# Patient Record
Sex: MALE | Race: WHITE
[De-identification: names, ages, dates, MRNs, and addresses within clinical notes are randomized per-mention and may not be internally consistent; named-entity substitution may affect disease eponyms.]

---

## 2018-01-11 ENCOUNTER — HOSPITAL ENCOUNTER (EMERGENCY)
Dept: HOSPITAL 63 - ER | Age: 53
Discharge: ANOTHER HOSPITAL | End: 2018-01-11
Payer: COMMERCIAL

## 2018-01-11 VITALS — SYSTOLIC BLOOD PRESSURE: 111 MMHG | DIASTOLIC BLOOD PRESSURE: 69 MMHG

## 2018-01-11 VITALS — HEIGHT: 75 IN | BODY MASS INDEX: 22.94 KG/M2 | WEIGHT: 184.53 LBS

## 2018-01-11 DIAGNOSIS — F17.210: ICD-10-CM

## 2018-01-11 DIAGNOSIS — R74.8: ICD-10-CM

## 2018-01-11 DIAGNOSIS — N32.9: ICD-10-CM

## 2018-01-11 DIAGNOSIS — K56.609: Primary | ICD-10-CM

## 2018-01-11 LAB
% BANDS: 4 % (ref 0–9)
% LYMPHS: 14 % (ref 24–48)
% MONOS: 4 % (ref 0–10)
% SEGS: 77 % (ref 35–66)
ALBUMIN SERPL-MCNC: 4 G/DL (ref 3.4–5)
ALBUMIN/GLOB SERPL: 1 {RATIO} (ref 1–1.7)
ALP SERPL-CCNC: 77 U/L (ref 46–116)
ALT SERPL-CCNC: 34 U/L (ref 16–63)
AMPHETAMINE/METHAMPHETAMINE: (no result)
ANION GAP SERPL CALC-SCNC: 8 MMOL/L (ref 6–14)
APTT PPP: (no result) S
AST SERPL-CCNC: 24 U/L (ref 15–37)
BACTERIA #/AREA URNS HPF: (no result) /HPF
BARBITURATES UR-MCNC: (no result) UG/ML
BASOPHILS # BLD AUTO: 0.1 X10^3/UL (ref 0–0.2)
BASOPHILS NFR BLD: 0 % (ref 0–3)
BENZODIAZ UR-MCNC: (no result) UG/L
BILIRUB SERPL-MCNC: 0.3 MG/DL (ref 0.2–1)
BILIRUB UR QL STRIP: (no result)
BUN/CREAT SERPL: 19 (ref 6–20)
CA-I SERPL ISE-MCNC: 25 MG/DL (ref 8–26)
CALCIUM SERPL-MCNC: 10.2 MG/DL (ref 8.5–10.1)
CANNABINOIDS UR-MCNC: (no result) UG/L
CHLORIDE SERPL-SCNC: 102 MMOL/L (ref 98–107)
CO2 SERPL-SCNC: 31 MMOL/L (ref 21–32)
COCAINE UR-MCNC: (no result) NG/ML
CREAT SERPL-MCNC: 1.3 MG/DL (ref 0.7–1.3)
EOSINOPHIL NFR BLD AUTO: 1 % (ref 0–5)
EOSINOPHIL NFR BLD: 0.3 X10^3/UL (ref 0–0.7)
EOSINOPHIL NFR BLD: 2 % (ref 0–3)
ERYTHROCYTE [DISTWIDTH] IN BLOOD BY AUTOMATED COUNT: 13.6 % (ref 11.5–14.5)
FIBRINOGEN PPP-MCNC: (no result) MG/DL
GFR SERPLBLD BASED ON 1.73 SQ M-ARVRAT: 57.5 ML/MIN
GLOBULIN SER-MCNC: 3.9 G/DL (ref 2.2–3.8)
GLUCOSE SERPL-MCNC: 130 MG/DL (ref 70–99)
GLUCOSE UR STRIP-MCNC: (no result) MG/DL
HCT VFR BLD CALC: 47.9 % (ref 39–53)
HGB BLD-MCNC: 16.6 G/DL (ref 13–17.5)
LIPASE: 446 U/L (ref 73–393)
LYMPHOCYTES # BLD: 1.8 X10^3/UL (ref 1–4.8)
LYMPHOCYTES NFR BLD AUTO: 10 % (ref 24–48)
MCH RBC QN AUTO: 32 PG (ref 25–35)
MCHC RBC AUTO-ENTMCNC: 35 G/DL (ref 31–37)
MCV RBC AUTO: 91 FL (ref 79–100)
METHADONE SERPL-MCNC: (no result) NG/ML
MONO #: 1 X10^3/UL (ref 0–1.1)
MONOCYTES NFR BLD: 6 % (ref 0–9)
NEUT #: 15.1 X10^3UL (ref 1.8–7.7)
NEUTROPHILS NFR BLD AUTO: 83 % (ref 31–73)
NITRITE UR QL STRIP: (no result)
OPIATES UR-MCNC: (no result) NG/ML
PCP SERPL-MCNC: (no result) MG/DL
PLATELET # BLD AUTO: 202 X10^3/UL (ref 140–400)
PLATELET # BLD EST: ADEQUATE 10*3/UL
POTASSIUM SERPL-SCNC: 4.4 MMOL/L (ref 3.5–5.1)
PROT SERPL-MCNC: 7.9 G/DL (ref 6.4–8.2)
RBC # BLD AUTO: 5.24 X10^6/UL (ref 4.3–5.7)
RBC #/AREA URNS HPF: >40 /HPF (ref 0–2)
SODIUM SERPL-SCNC: 141 MMOL/L (ref 136–145)
SP GR UR STRIP: 1.02
SQUAMOUS #/AREA URNS LPF: (no result) /LPF
UROBILINOGEN UR-MCNC: 0.2 MG/DL
WBC # BLD AUTO: 18.3 X10^3/UL (ref 4–11)
WBC #/AREA URNS HPF: (no result) /HPF (ref 0–4)

## 2018-01-11 PROCEDURE — 96374 THER/PROPH/DIAG INJ IV PUSH: CPT

## 2018-01-11 PROCEDURE — 80053 COMPREHEN METABOLIC PANEL: CPT

## 2018-01-11 PROCEDURE — 96361 HYDRATE IV INFUSION ADD-ON: CPT

## 2018-01-11 PROCEDURE — G0479 DRUG TEST PRESUMP NOT OPT: HCPCS

## 2018-01-11 PROCEDURE — 80307 DRUG TEST PRSMV CHEM ANLYZR: CPT

## 2018-01-11 PROCEDURE — 83605 ASSAY OF LACTIC ACID: CPT

## 2018-01-11 PROCEDURE — 36415 COLL VENOUS BLD VENIPUNCTURE: CPT

## 2018-01-11 PROCEDURE — 81001 URINALYSIS AUTO W/SCOPE: CPT

## 2018-01-11 PROCEDURE — 85025 COMPLETE CBC W/AUTO DIFF WBC: CPT

## 2018-01-11 PROCEDURE — 85007 BL SMEAR W/DIFF WBC COUNT: CPT

## 2018-01-11 PROCEDURE — 99285 EMERGENCY DEPT VISIT HI MDM: CPT

## 2018-01-11 PROCEDURE — 74176 CT ABD & PELVIS W/O CONTRAST: CPT

## 2018-01-11 PROCEDURE — 96375 TX/PRO/DX INJ NEW DRUG ADDON: CPT

## 2018-01-11 PROCEDURE — 83690 ASSAY OF LIPASE: CPT

## 2018-01-11 NOTE — RAD
INDICATION: 507409.001 Severe LLQ pain w/distension. No priors.

 

COMPARISON: None.

 

TECHNIQUE: 

 

Axial CT images were obtained through the abdomen and pelvis without 

intravenous contrast.  

Limited assessment of solid organ structures and vasculature secondary to 

lack of intravenous contrast.

 

One or more of the following individualized dose reduction techniques were

utilized for this examination:  1. Automated exposure control;  2. 

Adjustment of the mA and/or kV according to patient size;  3. Use of 

iterative reconstruction technique.

 

FINDINGS:

 

Mild calcific atherosclerosis without abdominal aortic aneurysm.

Small fat-containing inguinal hernias.

There is suspected bladder wall thickening as well as a high density 

structure along the posterior lateral wall of the urinary bladder on the 

right measuring up to about 6 x 4.8 cm.

No intrahepatic bile duct dilation.

Gallbladder partially contracted.

Pancreas not well evaluated on noncontrast examination.

Spleen unremarkable.

No left-sided hydronephrosis.

Multiple cystic lesions of the right kidney measuring up to approximately 

47 mm. No right-sided hydronephrosis.

There may be trace free fluid in the pelvis.

The appendix does not appear grossly inflamed.

Fat-containing umbilical hernia is seen.

There are some dilated loops of small bowel seen throughout a portion of 

the abdomen measuring up to approximately 31 mm.

Degenerative changes throughout spine.

Degenerative changes right hip with suspected subchondral cyst right 

femoral head.

Large disc protrusion and osteophyte formation is identified at L3-4 with 

severe central canal stenosis. There is additional disc protrusions and 

osteophyte formation at other levels contributing to central canal neural 

foraminal stenosis. Subcentimeter sclerotic focus sacrum.

 

IMPRESSION:

 

1. There are some mildly dilated loops of small bowel within the abdomen. 

Would correlate with symptoms in the region to ensure that this is not 

pathologic in causes from ileus or partial obstruction.

 

 

2. Within the urinary bladder along its posterior lateral wall of the 

right there is high density region identified. This could be secondary to 

debris/blood within the lumen but a bladder mass is in the differential 

for this finding. Further workup could be obtained with urology 

consultation but if additional imaging workup is desired at this time CT 

urogram could be obtained with delayed images during excretory phase.

 

3. Portions of the stomach wall appears thickened. Is difficult to tell if

this is secondary to lack of distention or if there is a pathologic 

process such as gastritis or a gastric mass.

 

4. Suspected cystic lesions of the right kidney.

 

5. Degenerative changes of the spine are identified with large disc 

protrusions and osteophyte formation contributing to multilevel central 

canal and neural foraminal stenosis which is severe at some of the levels.

 

Electronically signed by: Nolan Wing MD (1/11/2018 4:27 AM) [facility]

## 2018-02-21 ENCOUNTER — HOSPITAL ENCOUNTER (OUTPATIENT)
Dept: HOSPITAL 61 - SURG | Age: 53
Discharge: HOME | End: 2018-02-21
Attending: UROLOGY
Payer: COMMERCIAL

## 2018-02-21 DIAGNOSIS — Z86.69: ICD-10-CM

## 2018-02-21 DIAGNOSIS — D49.4: Primary | ICD-10-CM

## 2018-02-21 DIAGNOSIS — Z72.0: ICD-10-CM

## 2018-02-21 DIAGNOSIS — Z72.89: ICD-10-CM

## 2018-02-21 PROCEDURE — 51720 TREATMENT OF BLADDER LESION: CPT

## 2018-02-21 PROCEDURE — 88305 TISSUE EXAM BY PATHOLOGIST: CPT

## 2018-02-21 RX ADMIN — SODIUM CHLORIDE, SODIUM LACTATE, POTASSIUM CHLORIDE, AND CALCIUM CHLORIDE 1 MLS/HR: .6; .31; .03; .02 INJECTION, SOLUTION INTRAVENOUS at 13:41

## 2018-02-21 RX ADMIN — HYDROCODONE BITARTRATE AND ACETAMINOPHEN 1 TAB: 10; 325 TABLET ORAL at 16:56

## 2018-02-21 RX ADMIN — WATER 1: 1000 INJECTION, SOLUTION INTRAVENOUS at 15:00

## 2018-02-21 RX ADMIN — LIDOCAINE HYDROCHLORIDE 1 APP: 20 JELLY TOPICAL at 14:30

## 2021-09-22 NOTE — PHYS DOC
General


Chief Complaint:  ABDOMINAL PAIN


Stated Complaint:  ABDOMINAL PAIN,VOMITING X 1 WK


Time Seen by MD:  03:06


Source:  patient


Exam Limitations:  no limitations


Problems:  





History of Present Illness


Initial Comments


Patient is a 52-year-old male who comes to the ED complaining of severe 

abdominal pain.


Patient states that approximately 9 PM last night he developed sudden onset 

severe low abdominal and left-sided lower abdominal pain. He describes the pain 

as sharp and stabbing, severe 8 on a pain scale on my evaluation. He denies any 

nausea but states that several times throughout the night the pain grew so 

great that he feels he threw up due to severe pain. He denies any nausea no 

fever chills or body aches, his last bowel movement was a couple days ago which 

he states is normal for his baseline bowel regimen. Last by mouth intake was 

steak and butterscotch pudding for dinner last night made by his wife, although 

others ate the same thing no one else has exhibited any symptoms. He otherwise 

denies any exotic food intake or travel. He does admit that his abdomen feels 

very bloated.


On my evaluation he appears to be in severe discomfort, he is sitting up on the 

side of the bed moving consistent with renal colic symptoms. He does deny any 

flank pain no radiation to his groin and denies any difficulty with urination.


Other than a pneumothorax in the remote past successfully treated with chest 

tube he denies any other medical issues and takes no daily medications he does 

smoke about 1 pack per day.








Dilaudid 1 mg IV takes his pain from 8 on a pain scale to "just about nothing."


Timing/Duration:  other


Severity:  severe


Modifying Factors:  improves with other


Associated Symptoms:  nausea/vomiting, other


Allergies:  


Coded Allergies:  


     No Known Drug Allergies (Unverified , 18)





Past Medical History


Medical History:  other (pneumothorax)


Surgical History:  other (chest tube, back surgery)





Social History


Smoker:  cigarettes (about a pack a day at least for greater than 40 years)


Alcohol:  none


Drugs:  none





Departure


Disposition:   XFER SHT-TRM HOSP


Condition:  STABLE





Review of Systems


Constitutional:  denies chills, denies diaphoresis, denies fever, denies malaise


Respiratory:  denies cough, denies shortness of breath, denies wheezing


Cardiovascular:  denies chest pain, denies palpitations, denies syncope


Gastrointestinal:  see HPI, abdominal pain, denies constipation, denies diarrhea

, nausea, denies vomiting


Genitourinary:  denies dysuria, denies frequency, denies hematuria, denies pain


Musculoskeletal:  denies back pain, denies joint pain, denies joint swelling, 

denies neck pain


Psychiatric/Neurological:  denies headache, denies numbness, denies paresthesia


Hematologic/Lymphatic:  denies blood clots, denies easy bleeding, denies easy 

bruising





Physical Exam


General Appearance:  WD/WN, severe distress


Eyes:  bilateral eye normal inspection, bilateral eye PERRL, bilateral eye EOMI


Ear, Nose, Throat:  hearing grossly normal, normal ENT inspection, normal 

pharynx


Neck:  non-tender, supple


Respiratory:  normal breath sounds, no respiratory distress


Cardiovascular:  normal peripheral pulses, regular rate, rhythm


Gastrointestinal:  soft (distended/tympanic, negative Galaviz and McBurney the 

palpation of any part of the abdomen produces severe discomfort at the left 

abdomen and left lower quadrant), no pulsatile mass


Rectal:  deferred


Back:  no CVA tenderness, no vertebral tenderness


Extremities:  normal range of motion, non-tender, normal inspection


Neurologic/Psychiatric:  CNs II-XII nml as tested, no motor/sensory deficits, 

alert, normal mood/affect, oriented x 3


Skin:  normal color, warm/dry





Orders, Labs, Meds





PATIENT: KAMRAN CURRAN ACCOUNT: [id number] MRN#: [id number]


: 1965 LOCATION: ER AGE: 52


SEX: M EXAM DT: 18 ACCESSION#: [id number]


STATUS: REG ER ORD. PHYSICIAN: DEB NAIR DO 


REASON: severe LLQ pain w/distension stone vs obst vs divertic


PROCEDURE: CT ABDOMEN PELVIS WO CONTRAST





 


INDICATION: 914401.001 Severe LLQ pain w/distension. No priors.


 


COMPARISON: None.


 


TECHNIQUE: 


 


Axial CT images were obtained through the abdomen and pelvis without 


intravenous contrast.  


Limited assessment of solid organ structures and vasculature secondary to 


lack of intravenous contrast.


 


One or more of the following individualized dose reduction techniques were


utilized for this examination:  1. Automated exposure control;  2. 


Adjustment of the mA and/or kV according to patient size;  3. Use of 


iterative reconstruction technique.


 


FINDINGS:


 


Mild calcific atherosclerosis without abdominal aortic aneurysm.


Small fat-containing inguinal hernias.


There is suspected bladder wall thickening as well as a high density 


structure along the posterior lateral wall of the urinary bladder on the 


right measuring up to about 6 x 4.8 cm.


No intrahepatic bile duct dilation.


Gallbladder partially contracted.


Pancreas not well evaluated on noncontrast examination.


Spleen unremarkable.


No left-sided hydronephrosis.


Multiple cystic lesions of the right kidney measuring up to approximately 


47 mm. No right-sided hydronephrosis.


There may be trace free fluid in the pelvis.


The appendix does not appear grossly inflamed.


Fat-containing umbilical hernia is seen.


There are some dilated loops of small bowel seen throughout a portion of 


the abdomen measuring up to approximately 31 mm.


Degenerative changes throughout spine.


Degenerative changes right hip with suspected subchondral cyst right 


femoral head.


Large disc protrusion and osteophyte formation is identified at L3-4 with 


severe central canal stenosis. There is additional disc protrusions and 


osteophyte formation at other levels contributing to central canal neural 


foraminal stenosis. Subcentimeter sclerotic focus sacrum.


 


IMPRESSION:


 


1. There are some mildly dilated loops of small bowel within the abdomen. 


Would correlate with symptoms in the region to ensure that this is not 


pathologic in causes from ileus or partial obstruction.


 


 


2. Within the urinary bladder along its posterior lateral wall of the 


right there is high density region identified. This could be secondary to 


debris/blood within the lumen but a bladder mass is in the differential 


for this finding. Further workup could be obtained with urology 


consultation but if additional imaging workup is desired at this time CT 


urogram could be obtained with delayed images during excretory phase.


 


3. Portions of the stomach wall appears thickened. Is difficult to tell if


this is secondary to lack of distention or if there is a pathologic 


process such as gastritis or a gastric mass.


 


4. Suspected cystic lesions of the right kidney.


 


5. Degenerative changes of the spine are identified with large disc 


protrusions and osteophyte formation contributing to multilevel central 


canal and neural foraminal stenosis which is severe at some of the levels.


 


Electronically signed by: Anel Brower MD (2018 4:27 AM) [facility]














DICTATED AND SIGNED BY:     NAEL BROWER MD


DATE:     18 0407





CC: PCP,NO; DEB NAIR DO ~











Pertinent labs: White blood cells 18.3, bands 4, lipase 446, urinalysis greater 

than 40 red blood cells and large blood





Impressions:


Bowel obstruction


6 cm x 4.8 cm bladder mass


Questionable gastric mass


Elevated lipase





0450: I discussed the patient with on call hospitalist Dr. Beard at 

[facility]. After thorough discussion of the patient's history, 

ED presentation, and results he accepts the patient for MedSurg admission at 

[facility]. He does request that on arrival to the floor the 

patient's nurse contacted on-call urology and on-call general surgery to advise 

them of inpatient consultations.


Departure


Time of Disposition:  04:57


Disposition:  02 XFER SHT-TRM HOSP


Condition:  STABLE











KOREYDEB DO 2018 03:41
No